# Patient Record
Sex: MALE | Race: WHITE | NOT HISPANIC OR LATINO | Employment: OTHER | URBAN - METROPOLITAN AREA
[De-identification: names, ages, dates, MRNs, and addresses within clinical notes are randomized per-mention and may not be internally consistent; named-entity substitution may affect disease eponyms.]

---

## 2017-03-10 ENCOUNTER — PROCEDURE VISIT (OUTPATIENT)
Dept: UROLOGY | Facility: CLINIC | Age: 77
End: 2017-03-10
Attending: UROLOGY
Payer: MEDICARE

## 2017-03-10 VITALS
BODY MASS INDEX: 26.68 KG/M2 | HEART RATE: 96 BPM | WEIGHT: 170 LBS | SYSTOLIC BLOOD PRESSURE: 197 MMHG | DIASTOLIC BLOOD PRESSURE: 94 MMHG | HEIGHT: 67 IN

## 2017-03-10 DIAGNOSIS — C67.0 MALIGNANT NEOPLASM OF TRIGONE OF URINARY BLADDER: ICD-10-CM

## 2017-03-10 LAB
BILIRUB SERPL-MCNC: ABNORMAL MG/DL
BLOOD URINE, POC: ABNORMAL
COLOR, POC UA: ABNORMAL
GLUCOSE UR QL STRIP: 50
KETONES UR QL STRIP: ABNORMAL
LEUKOCYTE ESTERASE URINE, POC: ABNORMAL
NITRITE, POC UA: ABNORMAL
PH, POC UA: 6
PROTEIN, POC: 100
SPECIFIC GRAVITY, POC UA: 1.01
UROBILINOGEN, POC UA: ABNORMAL

## 2017-03-10 PROCEDURE — 88112 CYTOPATH CELL ENHANCE TECH: CPT | Mod: 26,,, | Performed by: PATHOLOGY

## 2017-03-10 PROCEDURE — 52000 CYSTOURETHROSCOPY: CPT | Mod: S$GLB,,, | Performed by: UROLOGY

## 2017-03-10 PROCEDURE — 81002 URINALYSIS NONAUTO W/O SCOPE: CPT | Mod: S$GLB,,, | Performed by: UROLOGY

## 2017-03-10 PROCEDURE — 88112 CYTOPATH CELL ENHANCE TECH: CPT | Performed by: PATHOLOGY

## 2017-03-10 RX ORDER — SULFAMETHOXAZOLE AND TRIMETHOPRIM 800; 160 MG/1; MG/1
1 TABLET ORAL
Status: COMPLETED | OUTPATIENT
Start: 2017-03-10 | End: 2017-03-10

## 2017-03-10 RX ORDER — LIDOCAINE HYDROCHLORIDE 20 MG/ML
JELLY TOPICAL
Status: COMPLETED | OUTPATIENT
Start: 2017-03-10 | End: 2017-03-10

## 2017-03-10 RX ORDER — FINASTERIDE 5 MG/1
5 TABLET, FILM COATED ORAL DAILY
Qty: 30 TABLET | Refills: 11 | Status: SHIPPED | OUTPATIENT
Start: 2017-03-10 | End: 2017-10-09 | Stop reason: SDUPTHER

## 2017-03-10 RX ADMIN — SULFAMETHOXAZOLE AND TRIMETHOPRIM 1 TABLET: 800; 160 TABLET ORAL at 04:03

## 2017-03-10 RX ADMIN — LIDOCAINE HYDROCHLORIDE: 20 JELLY TOPICAL at 04:03

## 2017-03-10 NOTE — PROCEDURES
Cystoscopy  Date/Time: 3/10/2017 2:39 PM  Performed by: CAMILLE MILLER  Authorized by: CAMILLE MILLER     Consent Done?:  Yes (Written)  Indications: history bladder cancer    Position:  Supine  Anesthesia:  Intraurethral instillation    Scope type:  Flexible cystoscope  Urethra normal: Yes    Prostate normal: No        Hyperplasia         Positive VaricesBladder neck normal: Bladder neck normal   Bladder normal: Yes       +median lobe    Bladder ca solitary low grade Ta 2015  Enlarged prostate    Dc avodart due to cost; start finasteride  Cytology today, if OK, rtc 6 months with cytology for cysto

## 2017-03-10 NOTE — MR AVS SNAPSHOT
[facility]  [address] [place] [address], [address]  [facility] [address]  Phone: [phone]                  Isaak Newberry   3/10/2017 2:00 PM   Procedure visit    Description:  Male : 1940   Provider:  Álvaro Tran MD   Department:  [facility]           Reason for Visit     Other           Diagnoses this Visit        Comments    Malignant neoplasm of trigone of urinary bladder                To Do List           Future Appointments        Provider Department Dept Phone    2017 1:00 PM Álvaro Tran MD [facility] [phone]      Goals (5 Years of Data)     None      [facility]     [facility] Care [facility] -  Assistance  Registered nurses in the [facility] provide clinical advisement, health education, appointment booking, and other advisory services.  Call for this free service at [phone].             Medications           Message regarding Medications     Verify the changes and/or additions to your medication regime listed below are the same as discussed with your clinician today.  If any of these changes or additions are incorrect, please notify your healthcare provider.             Verify that the below list of medications is an accurate representation of the medications you are currently taking.  If none reported, the list may be blank. If incorrect, please contact your healthcare provider. Carry this list with you in case of emergency.           Current Medications     aspirin 81 MG Chew Take 81 mg by mouth once daily.    clopidogrel (PLAVIX) 75 mg tablet Take 75 mg by mouth once daily.     dutasteride-tamsulosin 0.5-0.4 mg CM24     gabapentin (NEURONTIN) 300 MG capsule Take 1 capsule (300 mg total) by mouth every evening.    hydrocodone-acetaminophen 10-325mg (NORCO)  mg Tab     lisinopril (PRINIVIL,ZESTRIL) 20 MG tablet Take 20 mg by mouth once daily.     metformin (GLUCOPHAGE) 1000 MG tablet Take 1 tablet (1,000 mg  "total) by mouth 2 (two) times daily with meals.    metoprolol succinate (TOPROL-XL) 12.5 MG 24 hr split tablet Take by mouth once daily.    pravastatin (PRAVACHOL) 80 MG tablet Take 1 tablet (80 mg total) by mouth nightly.    ranitidine (ZANTAC) 300 MG tablet Take 1 tablet (300 mg total) by mouth every evening.           Clinical Reference Information           Your Vitals Were     BP Pulse Height Weight BMI    197/94 (BP Location: Left arm, Patient Position: Sitting, BP Method: Automatic) 96 5' 7" (1.702 m) 77.1 kg (170 lb) 26.63 kg/m2      Blood Pressure          Most Recent Value    BP  (!)  197/94      Allergies as of 3/10/2017     No Known Allergies      Immunizations Administered on Date of Encounter - 3/10/2017     None      Orders Placed During Today's Visit      Normal Orders This Visit    Cystoscopy       [facility] Sign-Up     Activating your [facility] account is as easy as 1-2-3!     1) Visit [email or web address], select Sign Up Now, enter this activation code and your date of birth, then select Next.  [email or web address]  Expires: 4/24/2017  2:26 PM      2) Create a username and password to use when you visit [facility] in the future and select a security question in case you lose your password and select Next.    3) Enter your e-mail address and click Sign Up!    Additional Information  If you have questions, please e-mail [email or web address] or call [phone] to talk to our [facility] staff. Remember, [facility] is NOT to be used for urgent needs. For medical emergencies, dial 911.         Language Assistance Services     ATTENTION: Language assistance services are available, free of charge. Please call [phone].      ATENCIÓN: Si habla español, tiene a lott disposición servicios gratuitos de asistencia lingüística. Llame al [phone].     CHÚ Ý: N?u b?n nói Ti?ng Vi?t, có các d?ch v? h? tr? ngôn ng? mi?n phí dành cho b?n. G?i s? [phone].         [facility] complies with applicable " Federal civil rights laws and does not discriminate on the basis of race, color, national origin, age, disability, or sex.

## 2017-05-01 ENCOUNTER — TELEPHONE (OUTPATIENT)
Dept: UROLOGY | Facility: CLINIC | Age: 77
End: 2017-05-01

## 2017-05-01 NOTE — TELEPHONE ENCOUNTER
----- Message from Duyen Lebron sent at 5/1/2017 12:54 PM CDT -----  Contact: Self  x  1st Request  _  2nd Request  _  3rd Request        Who: JESSIKA STOLL [[id number]]    Why: Pt states he is a current pt of Dr. Tran and wants his wife to see Dr. Tran for bladder issues. Pt was advised that Dr. Tran does not see new pt's for this concern. Pt was offered a different Physician, but declined.Pt requested to speak to the clinical team. Please call, thanks!    What Number to Call Back: [phone]    When to Expect a call back: (Before the end of the day)   -- if the call is after 12:00, the call back will be tomorrow.

## 2017-05-01 NOTE — TELEPHONE ENCOUNTER
Spoke with patient's wife.  Informed her Dr Tran does not treat urinary incontinence.  Appointment made for patient to see Dr Beaulieu.  Appointment letter mailed to patient

## 2017-05-08 ENCOUNTER — LAB VISIT (OUTPATIENT)
Dept: LAB | Facility: HOSPITAL | Age: 77
End: 2017-05-08
Attending: UROLOGY
Payer: MEDICARE

## 2017-05-08 ENCOUNTER — TELEPHONE (OUTPATIENT)
Dept: UROLOGY | Facility: CLINIC | Age: 77
End: 2017-05-08

## 2017-05-08 DIAGNOSIS — R31.9 HEMATURIA: ICD-10-CM

## 2017-05-08 DIAGNOSIS — R31.9 HEMATURIA: Primary | ICD-10-CM

## 2017-05-08 PROCEDURE — 87086 URINE CULTURE/COLONY COUNT: CPT

## 2017-05-08 NOTE — TELEPHONE ENCOUNTER
----- Message from Adelaida Maza sent at 5/8/2017  8:20 AM CDT -----  Contact: pt   X_  1st Request  _  2nd Request  _  3rd Request    Who:JESSIKA STOLL [[id number]]    Why: Patient states he has been urinating blood..... Please contact patient to further discuss and advise     What Number to Call Back: [phone]    When to Expect a call back: (Before the end of the day)   -- if call after 3:00 call back will be tomorrow.

## 2017-05-08 NOTE — TELEPHONE ENCOUNTER
I faxed order to  for US.  He brought urine for a culture to [facility] today.  He is double-booked at 8am on Friday morning.

## 2017-05-08 NOTE — TELEPHONE ENCOUNTER
"I spoke to pt  He feels well.  The urine is clearing "100%"  He will call his cardioloigst to see if he can hold his plavix.  He cannot get the US done until Thursday - he wants this done at [facility].  If he has severe, bleeding, weakness, dizziness etc, he will go to the nearest ER  Please schedule US for Thursday and appt to see me Friday    Thanks    sc  "

## 2017-05-10 ENCOUNTER — HOSPITAL ENCOUNTER (OUTPATIENT)
Dept: RADIOLOGY | Facility: HOSPITAL | Age: 77
Discharge: HOME | End: 2017-05-10
Attending: UROLOGY
Payer: MEDICARE

## 2017-05-10 DIAGNOSIS — R31.9 HEMATURIA: ICD-10-CM

## 2017-05-10 LAB — BACTERIA UR CULT: NORMAL

## 2017-05-10 PROCEDURE — 76700 US EXAM ABDOM COMPLETE: CPT | Mod: 26,,, | Performed by: RADIOLOGY

## 2017-05-10 PROCEDURE — 76700 US EXAM ABDOM COMPLETE: CPT | Mod: TC,PO

## 2017-05-12 ENCOUNTER — TELEPHONE (OUTPATIENT)
Dept: UROLOGY | Facility: CLINIC | Age: 77
End: 2017-05-12
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
## 2017-05-12 NOTE — TELEPHONE ENCOUNTER
----- Message from Marisela Monge sent at 5/12/2017 11:44 AM CDT -----  Contact: pt  _  1st Request  _  2nd Request  _  3rd Request        Who: pt    Why: pt requests his test results from the nurse. Please call the pt with results    What Number to Call Back:[phone]    When to Expect a call back: (Before the end of the day)   -- if the call is after 12:00, the call back will be tomorrow.

## 2017-05-12 NOTE — TELEPHONE ENCOUNTER
----- Message from Alison Herbbrea sent at 5/12/2017 12:34 PM CDT -----  _  1st Request  _x  2nd Request  _  3rd Request        Who: JESSIKA STOLL [[id number]]    Why: Pt returned a call from the office he stated that he will not be coming for his appointment, but he need to speak to the nurse as soon as possible. Please call him     What Number to Call Back: [phone]    When to Expect a call back: (Before the end of the day)   -- if the call is after 12:00, the call back will be tomorrow.

## 2017-05-17 ENCOUNTER — LAB VISIT (OUTPATIENT)
Dept: LAB | Facility: HOSPITAL | Age: 77
End: 2017-05-17
Attending: UROLOGY
Payer: MEDICARE

## 2017-05-17 DIAGNOSIS — R30.0 DYSURIA: ICD-10-CM

## 2017-05-17 DIAGNOSIS — R39.9 LOWER URINARY TRACT SYMPTOMS (LUTS): Primary | ICD-10-CM

## 2017-05-17 PROCEDURE — 87077 CULTURE AEROBIC IDENTIFY: CPT

## 2017-05-17 PROCEDURE — 87186 SC STD MICRODIL/AGAR DIL: CPT

## 2017-05-17 PROCEDURE — 87086 URINE CULTURE/COLONY COUNT: CPT

## 2017-05-17 PROCEDURE — 87088 URINE BACTERIA CULTURE: CPT

## 2017-05-17 RX ORDER — TAMSULOSIN HYDROCHLORIDE 0.4 MG/1
0.4 CAPSULE ORAL DAILY
Qty: 90 CAPSULE | Refills: 3 | Status: SHIPPED | OUTPATIENT
Start: 2017-05-17 | End: 2017-05-19

## 2017-05-17 NOTE — TELEPHONE ENCOUNTER
Pt understands that culture from 9 days ago was negative, but states that he has been having burning on urination for the last few days.  I ordered UC. He will go to [facility] this afternoon and bring urine for culture.  He has azo and is taking this.  I did not call in antibiotics.

## 2017-05-17 NOTE — TELEPHONE ENCOUNTER
----- Message from Hope Stern sent at 5/17/2017  1:57 PM CDT -----  Contact: pt  _x  1st Request  _  2nd Request  _  3rd Request      Who:pt    Why: pt states that he would like to speak to staff in regards to possible bladder infection     What Number to Call Back: [phone]    When to Expect a call back: (Before the end of the day)   -- if call after 3:00 call back will be tomorrow.

## 2017-05-18 ENCOUNTER — TELEPHONE (OUTPATIENT)
Dept: UROLOGY | Facility: CLINIC | Age: 77
End: 2017-05-18

## 2017-05-18 NOTE — TELEPHONE ENCOUNTER
Pt's son called today to get UC results on urine his father  dropped off yesterday at lab.  They of course were not available this soon.  Pt had vomiting, denies fever, nausea.  Per Dr. Tran, pt added to Anusha's schedule and he will come to office with his son.

## 2017-05-19 ENCOUNTER — OFFICE VISIT (OUTPATIENT)
Dept: UROLOGY | Facility: CLINIC | Age: 77
End: 2017-05-19
Payer: MEDICARE

## 2017-05-19 VITALS
SYSTOLIC BLOOD PRESSURE: 192 MMHG | HEART RATE: 107 BPM | HEIGHT: 67 IN | BODY MASS INDEX: 26.68 KG/M2 | WEIGHT: 170 LBS | TEMPERATURE: 96 F | DIASTOLIC BLOOD PRESSURE: 86 MMHG

## 2017-05-19 DIAGNOSIS — N39.0 URINARY TRACT INFECTION WITH HEMATURIA, SITE UNSPECIFIED: Primary | ICD-10-CM

## 2017-05-19 DIAGNOSIS — R31.9 URINARY TRACT INFECTION WITH HEMATURIA, SITE UNSPECIFIED: Primary | ICD-10-CM

## 2017-05-19 LAB — BACTERIA UR CULT: NORMAL

## 2017-05-19 PROCEDURE — 3079F DIAST BP 80-89 MM HG: CPT | Mod: S$GLB,,, | Performed by: NURSE PRACTITIONER

## 2017-05-19 PROCEDURE — 99213 OFFICE O/P EST LOW 20 MIN: CPT | Mod: 25,S$GLB,, | Performed by: NURSE PRACTITIONER

## 2017-05-19 PROCEDURE — 99999 PR PBB SHADOW E&M-EST. PATIENT-LVL II: CPT | Mod: PBBFAC,,, | Performed by: NURSE PRACTITIONER

## 2017-05-19 PROCEDURE — 1159F MED LIST DOCD IN RCRD: CPT | Mod: S$GLB,,, | Performed by: NURSE PRACTITIONER

## 2017-05-19 PROCEDURE — 96372 THER/PROPH/DIAG INJ SC/IM: CPT | Mod: S$GLB,,, | Performed by: NURSE PRACTITIONER

## 2017-05-19 PROCEDURE — 1125F AMNT PAIN NOTED PAIN PRSNT: CPT | Mod: S$GLB,,, | Performed by: NURSE PRACTITIONER

## 2017-05-19 PROCEDURE — 1160F RVW MEDS BY RX/DR IN RCRD: CPT | Mod: S$GLB,,, | Performed by: NURSE PRACTITIONER

## 2017-05-19 PROCEDURE — 3077F SYST BP >= 140 MM HG: CPT | Mod: S$GLB,,, | Performed by: NURSE PRACTITIONER

## 2017-05-19 RX ORDER — CEFTRIAXONE 1 G/1
1 INJECTION, POWDER, FOR SOLUTION INTRAMUSCULAR; INTRAVENOUS
Status: COMPLETED | OUTPATIENT
Start: 2017-05-19 | End: 2017-05-19

## 2017-05-19 RX ORDER — CIPROFLOXACIN 500 MG/1
500 TABLET ORAL 2 TIMES DAILY
Qty: 14 TABLET | Refills: 0 | Status: SHIPPED | OUTPATIENT
Start: 2017-05-19 | End: 2017-05-26

## 2017-05-19 RX ADMIN — CEFTRIAXONE 1 G: 1 INJECTION, POWDER, FOR SOLUTION INTRAMUSCULAR; INTRAVENOUS at 11:05

## 2017-05-19 NOTE — PROGRESS NOTES
"Subjective:      Isaak Newberry is a 77 y.o. male who returns today regarding his UTI symptoms. He is an established patient of Dr. Tran and is new to me today.     He presents today reporting dysuria, urinary hesitancy, frequency, and urgency for the last 3 days. He also reports a low grade fever that developed yesterday. Denies flank pain, N/V, and hematuria. Urine culture is + for presumptive E coli. Recent abdominal US shows no renal stones. The patient denies a history of frequent urinary infections.     The following portions of the patient's history were reviewed and updated as appropriate: allergies, current medications, past family history, past medical history, past social history, past surgical history and problem list.    Review of Systems  A comprehensive multipoint review of systems was negative except as otherwise stated in the HPI.     Objective:   Vitals: BP (!) 192/86 (BP Location: Right arm, Patient Position: Sitting, BP Method: Automatic)  Pulse 107  Temp 96.1 °F (35.6 °C)  Ht 5' 7" (1.702 m)  Wt 77.1 kg (169 lb 15.6 oz)  BMI 26.62 kg/m2    Physical Exam   General: alert and oriented, no acute distress  Respiratory: Symmetric expansion, non-labored breathing  Cardiovascular: no peripheral edema  Abdomen: soft, non distended  Skin: normal coloration and turgor, no rashes, no suspicious skin lesions noted  Neuro: no gross deficits  Psych: normal judgment and insight, normal mood/affect and non-anxious    Lab Review   Urinalysis demonstrates : no urine sample today; + urine culture for e coli   Lab Results   Component Value Date    WBC 8.15 12/30/2016    HGB 16.2 12/30/2016    HCT 43.3 12/30/2016    MCV 95 12/30/2016     12/30/2016     Lab Results   Component Value Date    CREATININE 1.21 12/30/2016    BUN 25 (H) 12/30/2016     Urine Culture    2d ago     Urine Culture, Routine PRESUMPTIVE E COLI  >100,000 cfu/ml  Identification and susceptibility pending  P     Imaging   All " "images have been personally reviewed and agree with the findings below:   Renal US (5/8/17)- "1.  No acute intra-abdominal process appreciated.  No definite renal stones or solid renal mass appreciated to explain hematuria. 2.  Echogenic kidneys which could signify underlying chronic medical renal disease. 3.  Status post cholecystectomy."    Assessment and Plan:   Isaak was seen today for establish care.    Diagnoses and all orders for this visit:    Urinary tract infection with hematuria, site unspecified  -     cefTRIAXone injection 1 g; Inject 1 g into the muscle one time.  -     ciprofloxacin HCl (CIPRO) 500 MG tablet; Take 1 tablet (500 mg total) by mouth 2 (two) times daily.    Plan:  --Urine culture + for E coli, susceptibility pending - will notify the patient if antibiotics need to be adjusted   --Rocephin 1 gram in the clinic today    --Cipro x 1 week  --Patient again instructed to follow up with his cardiologist and PCP regarding his Plavix therapy.     --Instructed the patient to report to the ED if his symptoms worsen or if he develops fever. Patient verbalized understanding.   "

## 2017-05-23 ENCOUNTER — TELEPHONE (OUTPATIENT)
Dept: UROLOGY | Facility: CLINIC | Age: 77
End: 2017-05-23

## 2017-05-23 NOTE — TELEPHONE ENCOUNTER
----- Message from Marisela Monge sent at 5/23/2017  3:58 PM CDT -----  Contact: pt  _  1st Request  _  2nd Request  _  3rd Request        Who: pt    Why: pt needs his test results. Please call the pt     What Number to Call Back:[phone]    When to Expect a call back: (Before the end of the day)   -- if the call is after 12:00, the call back will be tomorrow.

## 2017-10-09 DIAGNOSIS — N40.0 ENLARGED PROSTATE: Primary | ICD-10-CM

## 2017-10-09 RX ORDER — FINASTERIDE 5 MG/1
5 TABLET, FILM COATED ORAL DAILY
Qty: 90 TABLET | Refills: 2 | Status: SHIPPED | OUTPATIENT
Start: 2017-10-09 | End: 2018-05-23 | Stop reason: SDUPTHER

## 2018-04-25 DIAGNOSIS — C67.9 MALIGNANT NEOPLASM OF URINARY BLADDER, UNSPECIFIED SITE: Primary | ICD-10-CM

## 2018-05-23 DIAGNOSIS — N40.0 ENLARGED PROSTATE: ICD-10-CM

## 2018-05-23 RX ORDER — TAMSULOSIN HYDROCHLORIDE 0.4 MG/1
0.4 CAPSULE ORAL DAILY
Qty: 90 CAPSULE | Refills: 3 | Status: SHIPPED | OUTPATIENT
Start: 2018-05-23 | End: 2018-05-23 | Stop reason: SDUPTHER

## 2018-05-23 RX ORDER — FINASTERIDE 5 MG/1
5 TABLET, FILM COATED ORAL DAILY
Qty: 90 TABLET | Refills: 3 | Status: SHIPPED | OUTPATIENT
Start: 2018-05-23 | End: 2018-05-23 | Stop reason: SDUPTHER

## 2018-06-05 ENCOUNTER — TELEPHONE (OUTPATIENT)
Dept: UROLOGY | Facility: CLINIC | Age: 78
End: 2018-06-05

## 2018-06-05 NOTE — TELEPHONE ENCOUNTER
----- Message from Brandee Lane sent at 6/5/2018  9:47 AM CDT -----  Contact: JESSIKA STOLL [[id number]]            Name of Who is Calling: JESSIKA STOLL [[id number]]    What is the request in detail: pt would like to cancel procedure and will call back to reschedule.     Can the clinic reply by [facility]: no    What [facility] Call Back [facility]: [phone] (home)

## 2018-06-27 ENCOUNTER — TELEPHONE (OUTPATIENT)
Dept: UROLOGY | Facility: CLINIC | Age: 78
End: 2018-06-27

## 2018-06-27 NOTE — TELEPHONE ENCOUNTER
I spoke to pt.  His wife is in the hospital and he needs to r/s cystoscopy.  Rescheduled and appt letter sent.

## 2018-06-27 NOTE — TELEPHONE ENCOUNTER
----- Message from Louise Yan sent at 6/27/2018 10:54 AM CDT -----  Contact: Guanako Newberry             Name of Who is Calling: Guanako Newberry Son       What is the request in detail: Pt son is calling to inform clinical staff that pt won't be able to make it to his appointment on Friday due to being in the hospital with his wife.  Pt son says that pt can be reached at [facility] at [phone] in room 203 and if he can't be reached there then the son can be reached.       Can the clinic reply by [facility]: No      What [facility] Back [facility]: [phone]

## 2018-08-12 PROBLEM — E87.1 HYPONATREMIA: Status: ACTIVE | Noted: 2018-08-12
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
## 2018-08-12 PROBLEM — N39.0 UTI (URINARY TRACT INFECTION): Status: ACTIVE | Noted: 2018-08-12

## 2018-08-12 PROBLEM — R19.7 DIARRHEA: Status: ACTIVE | Noted: 2018-08-12

## 2018-08-12 PROBLEM — E87.6 HYPOKALEMIA: Status: ACTIVE | Noted: 2018-08-12

## 2018-08-12 PROBLEM — R31.0 GROSS HEMATURIA: Status: ACTIVE | Noted: 2018-08-12

## 2018-08-12 PROBLEM — S51.012A SKIN TEAR OF LEFT ELBOW WITHOUT COMPLICATION: Status: ACTIVE | Noted: 2018-08-12

## 2018-08-13 ENCOUNTER — TELEPHONE (OUTPATIENT)
Dept: NEUROLOGY | Facility: CLINIC | Age: 78
End: 2018-08-13

## 2018-08-13 NOTE — TELEPHONE ENCOUNTER
----- Message from Sabrina Lockhart MD sent at 8/13/2018  1:40 PM CDT -----  Can we arrange an appointment for his with Dr. Lau or Sivan for diagnosis of chorea?

## 2019-06-25 DIAGNOSIS — N40.0 ENLARGED PROSTATE: ICD-10-CM

## 2019-06-25 RX ORDER — FINASTERIDE 5 MG/1
TABLET, FILM COATED ORAL
Qty: 90 TABLET | Refills: 0 | Status: SHIPPED | OUTPATIENT
Start: 2019-06-25 | End: 2019-08-20 | Stop reason: SDUPTHER

## 2019-06-28 ENCOUNTER — EXTERNAL HOME HEALTH (OUTPATIENT)
Dept: HOME HEALTH SERVICES | Facility: HOSPITAL | Age: 79
End: 2019-06-28

## 2019-07-12 PROBLEM — N39.0 UTI (URINARY TRACT INFECTION): Status: RESOLVED | Noted: 2018-08-12 | Resolved: 2019-07-12
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
## 2019-07-12 PROBLEM — S51.012A SKIN TEAR OF LEFT ELBOW WITHOUT COMPLICATION: Status: RESOLVED | Noted: 2018-08-12 | Resolved: 2019-07-12

## 2019-07-12 PROBLEM — R19.7 DIARRHEA: Status: RESOLVED | Noted: 2018-08-12 | Resolved: 2019-07-12

## 2020-02-10 ENCOUNTER — TELEPHONE (OUTPATIENT)
Dept: UROLOGY | Facility: CLINIC | Age: 80
End: 2020-02-10

## 2020-02-10 NOTE — TELEPHONE ENCOUNTER
----- Message from Suresh Tovar sent at 2/10/2020  1:51 PM CST -----  Contact: JESSIKA STOLL [[id number]]   Name of Who is Calling:     What is the request in detail: patient request call back in reference to questions/concerns about lab results   Please contact to further discuss and advise      Can the clinic reply by [facility]: no     What Number to Call Back if not in [facility]:  [phone]

## 2020-02-10 NOTE — TELEPHONE ENCOUNTER
I called the patient     He saw me last in 2017 and recently had seen another urologist on the [place]    We will schedule an appointment with the nurse practitioner.  We will check a urinalysis, urine cytology and this schedule cystoscopy    He agrees with this plan

## 2020-02-10 NOTE — TELEPHONE ENCOUNTER
----- Message from Elvia Sharpe MA sent at 2/10/2020  2:12 PM CST -----  Contact: JESSIKA STOLL [[id number]]   Patient stated that he spoke to his friend and was informed that if he took zantac that may have given him bladder cancer.thank you  ----- Message -----  From: Suresh Tovar  Sent: 2/10/2020   1:51 PM CST  To: [facility] Staff     Name of Who is Calling:     What is the request in detail: patient request call back in reference to questions/concerns about lab results   Please contact to further discuss and advise      Can the clinic reply by [facility]: no     What Number to Call Back if not in [facility]:  [phone]

## 2020-02-11 ENCOUNTER — TELEPHONE (OUTPATIENT)
Dept: UROLOGY | Facility: CLINIC | Age: 80
End: 2020-02-11

## 2020-02-11 NOTE — TELEPHONE ENCOUNTER
----- Message from Elvia Sharpe MA sent at 2/10/2020  2:28 PM CST -----        Preferred Name:   Lam Stoll  Male, 80 y.o., 1940  Phone:   [phone] (H)  PCP:   Prashant Urbano MD  Language:   English  Need Interp:   No  Allergies Last Reviewed:   11/14/19  Allergies:   Metformin  Health Maintenance:   Due  FYI  General  Primary Ins.:   [employer] MA[employer]GED MEDICARE  MRN:   [id number]  Pt Comm Pref:   Patient Portal, Mail  Next Appt:   None  My Sticky Note:     Specialty Comments:     Patient Calls     Álvaro Tran MD routed conversation to [facility] Staff 7 minutes ago (2:20 PM)    Álvaro Tran MD 7 minutes ago (2:20 PM)          I called the patient      He saw me last in 2017 and recently had seen another urologist on the [place]     We will schedule an appointment with the nurse practitioner.  We will check a urinalysis, urine cytology and this schedule cystoscopy     He agrees with this plan      Documentation     Álvaro Tran MD 8 minutes ago (2:19 PM)          ----- Message from Elvia Sharpe MA sent at 2/10/2020  2:12 PM CST -----  Contact: JESSIKA STOLL [[id number]]   Patient stated that he spoke to his friend and was informed that if he took zantac that may have given him bladder cancer.thank you  ----- Message -----  From: Suresh Tovar  Sent: 2/10/2020   1:51 PM CST  To: [facility] Staff      Name of Who is Calling:      What is the request in detail: patient request call back in reference to questions/concerns about lab results   Please contact to further discuss and advise       Can the clinic reply by [facility]: no      What [facility]:  [phone]            Documentation

## 2020-07-14 ENCOUNTER — TELEPHONE (OUTPATIENT)
Dept: UROLOGY | Facility: CLINIC | Age: 80
End: 2020-07-14

## 2020-07-14 ENCOUNTER — OFFICE VISIT (OUTPATIENT)
Dept: FAMILY MEDICINE | Facility: CLINIC | Age: 80
End: 2020-07-14
Payer: MEDICARE

## 2020-07-14 ENCOUNTER — LAB VISIT (OUTPATIENT)
Dept: LAB | Facility: HOSPITAL | Age: 80
End: 2020-07-14
Attending: NURSE PRACTITIONER
Payer: MEDICARE

## 2020-07-14 VITALS
DIASTOLIC BLOOD PRESSURE: 76 MMHG | WEIGHT: 153.25 LBS | OXYGEN SATURATION: 98 % | HEART RATE: 88 BPM | BODY MASS INDEX: 24.05 KG/M2 | HEIGHT: 67 IN | TEMPERATURE: 99 F | SYSTOLIC BLOOD PRESSURE: 128 MMHG

## 2020-07-14 DIAGNOSIS — E87.1 LOW SODIUM LEVELS: ICD-10-CM

## 2020-07-14 DIAGNOSIS — N40.0 BENIGN PROSTATIC HYPERPLASIA, UNSPECIFIED WHETHER LOWER URINARY TRACT SYMPTOMS PRESENT: ICD-10-CM

## 2020-07-14 DIAGNOSIS — R30.0 DYSURIA: ICD-10-CM

## 2020-07-14 DIAGNOSIS — N30.00 ACUTE CYSTITIS WITHOUT HEMATURIA: ICD-10-CM

## 2020-07-14 DIAGNOSIS — N18.30 KIDNEY DISEASE, CHRONIC, STAGE III (GFR 30-59 ML/MIN): ICD-10-CM

## 2020-07-14 DIAGNOSIS — N30.00 ACUTE CYSTITIS WITHOUT HEMATURIA: Primary | ICD-10-CM

## 2020-07-14 LAB
ALBUMIN SERPL BCP-MCNC: 3.4 G/DL (ref 3.5–5.2)
ALP SERPL-CCNC: 77 U/L (ref 55–135)
ALT SERPL W/O P-5'-P-CCNC: 11 U/L (ref 10–44)
ANION GAP SERPL CALC-SCNC: 10 MMOL/L (ref 8–16)
AST SERPL-CCNC: 16 U/L (ref 10–40)
BACTERIA #/AREA URNS HPF: ABNORMAL /HPF
BASOPHILS # BLD AUTO: 0.05 K/UL (ref 0–0.2)
BASOPHILS NFR BLD: 0.4 % (ref 0–1.9)
BILIRUB SERPL-MCNC: 0.7 MG/DL (ref 0.1–1)
BILIRUB UR QL STRIP: NEGATIVE
BUN SERPL-MCNC: 25 MG/DL (ref 8–23)
CALCIUM SERPL-MCNC: 8.8 MG/DL (ref 8.7–10.5)
CHLORIDE SERPL-SCNC: 96 MMOL/L (ref 95–110)
CLARITY UR: ABNORMAL
CO2 SERPL-SCNC: 25 MMOL/L (ref 23–29)
COLOR UR: ABNORMAL
CREAT SERPL-MCNC: 1.4 MG/DL (ref 0.5–1.4)
DIFFERENTIAL METHOD: ABNORMAL
EOSINOPHIL # BLD AUTO: 0.1 K/UL (ref 0–0.5)
EOSINOPHIL NFR BLD: 1.2 % (ref 0–8)
ERYTHROCYTE [DISTWIDTH] IN BLOOD BY AUTOMATED COUNT: 12.6 % (ref 11.5–14.5)
EST. GFR  (AFRICAN AMERICAN): 53.7 ML/MIN/1.73 M^2
EST. GFR  (NON AFRICAN AMERICAN): 46.5 ML/MIN/1.73 M^2
GLUCOSE SERPL-MCNC: 181 MG/DL (ref 70–110)
GLUCOSE UR QL STRIP: ABNORMAL
HCT VFR BLD AUTO: 34 % (ref 40–54)
HGB BLD-MCNC: 12 G/DL (ref 14–18)
HGB UR QL STRIP: ABNORMAL
HYALINE CASTS #/AREA URNS LPF: 0 /LPF
IMM GRANULOCYTES # BLD AUTO: 0.04 K/UL (ref 0–0.04)
IMM GRANULOCYTES NFR BLD AUTO: 0.3 % (ref 0–0.5)
KETONES UR QL STRIP: NEGATIVE
LEUKOCYTE ESTERASE UR QL STRIP: ABNORMAL
LYMPHOCYTES # BLD AUTO: 1.1 K/UL (ref 1–4.8)
LYMPHOCYTES NFR BLD: 9.7 % (ref 18–48)
MCH RBC QN AUTO: 32.7 PG (ref 27–31)
MCHC RBC AUTO-ENTMCNC: 35.3 G/DL (ref 32–36)
MCV RBC AUTO: 93 FL (ref 82–98)
MICROSCOPIC COMMENT: ABNORMAL
MONOCYTES # BLD AUTO: 0.8 K/UL (ref 0.3–1)
MONOCYTES NFR BLD: 6.9 % (ref 4–15)
NEUTROPHILS # BLD AUTO: 9.3 K/UL (ref 1.8–7.7)
NEUTROPHILS NFR BLD: 81.5 % (ref 38–73)
NITRITE UR QL STRIP: POSITIVE
NRBC BLD-RTO: 0 /100 WBC
PH UR STRIP: 6 [PH] (ref 5–8)
PLATELET # BLD AUTO: 189 K/UL (ref 150–350)
PMV BLD AUTO: 9.5 FL (ref 9.2–12.9)
POTASSIUM SERPL-SCNC: 3.8 MMOL/L (ref 3.5–5.1)
PROT SERPL-MCNC: 6.1 G/DL (ref 6–8.4)
PROT UR QL STRIP: ABNORMAL
RBC # BLD AUTO: 3.67 M/UL (ref 4.6–6.2)
RBC #/AREA URNS HPF: 2 /HPF (ref 0–4)
SODIUM SERPL-SCNC: 131 MMOL/L (ref 136–145)
SP GR UR STRIP: 1.01 (ref 1–1.03)
URN SPEC COLLECT METH UR: ABNORMAL
WBC # BLD AUTO: 11.46 K/UL (ref 3.9–12.7)
WBC #/AREA URNS HPF: >100 /HPF (ref 0–5)

## 2020-07-14 PROCEDURE — 3078F PR MOST RECENT DIASTOLIC BLOOD PRESSURE < 80 MM HG: ICD-10-PCS | Mod: CPTII,S$GLB,, | Performed by: NURSE PRACTITIONER

## 2020-07-14 PROCEDURE — 99999 PR PBB SHADOW E&M-EST. PATIENT-LVL V: CPT | Mod: PBBFAC,,, | Performed by: NURSE PRACTITIONER

## 2020-07-14 PROCEDURE — 36415 COLL VENOUS BLD VENIPUNCTURE: CPT | Mod: PO

## 2020-07-14 PROCEDURE — 99214 PR OFFICE/OUTPT VISIT, EST, LEVL IV, 30-39 MIN: ICD-10-PCS | Mod: S$GLB,,, | Performed by: NURSE PRACTITIONER

## 2020-07-14 PROCEDURE — 1126F PR PAIN SEVERITY QUANTIFIED, NO PAIN PRESENT: ICD-10-PCS | Mod: S$GLB,,, | Performed by: NURSE PRACTITIONER

## 2020-07-14 PROCEDURE — 1159F MED LIST DOCD IN RCRD: CPT | Mod: S$GLB,,, | Performed by: NURSE PRACTITIONER

## 2020-07-14 PROCEDURE — 1101F PT FALLS ASSESS-DOCD LE1/YR: CPT | Mod: CPTII,S$GLB,, | Performed by: NURSE PRACTITIONER

## 2020-07-14 PROCEDURE — 3078F DIAST BP <80 MM HG: CPT | Mod: CPTII,S$GLB,, | Performed by: NURSE PRACTITIONER

## 2020-07-14 PROCEDURE — 1159F PR MEDICATION LIST DOCUMENTED IN MEDICAL RECORD: ICD-10-PCS | Mod: S$GLB,,, | Performed by: NURSE PRACTITIONER

## 2020-07-14 PROCEDURE — 99214 OFFICE O/P EST MOD 30 MIN: CPT | Mod: S$GLB,,, | Performed by: NURSE PRACTITIONER

## 2020-07-14 PROCEDURE — 80053 COMPREHEN METABOLIC PANEL: CPT

## 2020-07-14 PROCEDURE — 3074F SYST BP LT 130 MM HG: CPT | Mod: CPTII,S$GLB,, | Performed by: NURSE PRACTITIONER

## 2020-07-14 PROCEDURE — 81000 URINALYSIS NONAUTO W/SCOPE: CPT | Mod: PO

## 2020-07-14 PROCEDURE — 1126F AMNT PAIN NOTED NONE PRSNT: CPT | Mod: S$GLB,,, | Performed by: NURSE PRACTITIONER

## 2020-07-14 PROCEDURE — 85025 COMPLETE CBC W/AUTO DIFF WBC: CPT

## 2020-07-14 PROCEDURE — 3074F PR MOST RECENT SYSTOLIC BLOOD PRESSURE < 130 MM HG: ICD-10-PCS | Mod: CPTII,S$GLB,, | Performed by: NURSE PRACTITIONER

## 2020-07-14 PROCEDURE — 1101F PR PT FALLS ASSESS DOC 0-1 FALLS W/OUT INJ PAST YR: ICD-10-PCS | Mod: CPTII,S$GLB,, | Performed by: NURSE PRACTITIONER

## 2020-07-14 PROCEDURE — 99999 PR PBB SHADOW E&M-EST. PATIENT-LVL V: ICD-10-PCS | Mod: PBBFAC,,, | Performed by: NURSE PRACTITIONER

## 2020-07-14 PROCEDURE — 87086 URINE CULTURE/COLONY COUNT: CPT

## 2020-07-14 RX ORDER — SULFAMETHOXAZOLE AND TRIMETHOPRIM 800; 160 MG/1; MG/1
1 TABLET ORAL 2 TIMES DAILY
Qty: 14 TABLET | Refills: 0 | Status: SHIPPED | OUTPATIENT
Start: 2020-07-14 | End: 2020-07-21

## 2020-07-14 NOTE — PROGRESS NOTES
Please call the patient and let him know that the urine shows infection. I have sent bactrim in to the pharmacy. We will follow up with the urine culture. Thanks

## 2020-07-14 NOTE — PROGRESS NOTES
Subjective:       Patient ID: Isaak Newberry is a 80 y.o. male.    Chief Complaint: Urinary Tract Infection (blood in urine today---)    Patient who is new to me presents for dysuria and hematuria. He states he noticed a small amount of blood this AM. He took pyridium for the symptoms and it helped. He is scheduled to see urology in a few weeks.     Dysuria   This is a new problem. The current episode started today. The problem occurs intermittently. The problem has been unchanged. The quality of the pain is described as burning (burning in the morning). There has been no fever. Associated symptoms include frequency, hematuria and urgency. Pertinent negatives include no behavior changes, chills, discharge, flank pain or withholding. Treatments tried: pyridium. The treatment provided moderate relief. 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 There is no history of diabetes mellitus or recurrent UTIs.     Review of Systems   Constitutional: Negative for chills, fatigue and fever.   HENT: Negative for congestion, ear pain, sinus pressure and sore throat.    Respiratory: Negative for shortness of breath and wheezing.    Cardiovascular: Negative for chest pain and leg swelling.   Gastrointestinal: Negative for abdominal distention and abdominal pain.   Genitourinary: Positive for dysuria, frequency, hematuria and urgency. Negative for flank pain.   Skin: Negative for color change and pallor.   Neurological: Negative for light-headedness, numbness and headaches.       Objective:      Physical Exam  Vitals signs and nursing note reviewed.   Constitutional:       Appearance: He is well-developed.   HENT:      Head: Normocephalic and atraumatic.      Right Ear: External ear normal.      Left Ear: External ear normal.   Eyes:      Conjunctiva/sclera: Conjunctivae normal.      Pupils: Pupils are equal, round, and reactive to light.   Neck:       Musculoskeletal: Normal range of motion and neck supple.   Cardiovascular:      Rate and Rhythm: Normal rate and regular rhythm.   Pulmonary:      Effort: Pulmonary effort is normal.      Breath sounds: Normal breath sounds.   Abdominal:      General: Bowel sounds are normal.      Palpations: Abdomen is soft.      Tenderness: There is no abdominal tenderness. There is no right CVA tenderness or left CVA tenderness.   Musculoskeletal: Normal range of motion.   Skin:     General: Skin is warm and dry.   Neurological:      Mental Status: He is alert and oriented to person, place, and time.         Assessment:       1. Acute cystitis without hematuria    2. Dysuria    3. Low sodium levels    4. Stage 3 CKD    5. Benign prostatic hyperplasia, unspecified whether lower urinary tract symptoms present        Plan:       Isaak was seen today for urinary tract infection.    Diagnoses and all orders for this visit:    Acute cystitis without hematuria  -     Comprehensive metabolic panel; Future  -     CBC auto differential; Future    Dysuria  -     Urinalysis, Reflex to Urine Culture Urine, Clean Catch  -     Comprehensive metabolic panel; Future  -     CBC auto differential; Future    Low sodium levels  -     Basic metabolic panel; Future    Stage 3 CKD  -     Urinalysis, Reflex to Urine Culture Urine, Clean Catch  -     Comprehensive metabolic panel; Future  -     Urinalysis Microscopic  -     Urine culture  -     sulfamethoxazole-trimethoprim 800-160mg (BACTRIM DS) 800-160 mg Tab; Take 1 tablet by mouth 2 (two) times daily. for 7 days    Benign prostatic hyperplasia, unspecified whether lower urinary tract symptoms present  Will follow up with urology.    Discussed worsening signs/symptoms and when to return to clinic or go to ED.   Patient expresses understanding and agrees with treatment plan.         Creatinine clearance 43.07

## 2020-07-14 NOTE — TELEPHONE ENCOUNTER
----- Message from Tabatha Edwin sent at 7/14/2020 11:46 AM CDT -----  Regarding: bad UTI  Contact: pt  Type: Needs Medical Advice    Who Called:      Best Call Back Number:     Additional Information: Requesting a call back from Nurse, regarding access to an appt for today pt is in a lot of pain and maybe have a Bad UTI  with little blood and wants to be seen and meds,please call back with advice

## 2020-07-14 NOTE — TELEPHONE ENCOUNTER
----- Message from Suresh Tovar sent at 7/14/2020 10:08 AM CDT -----   Name of Who is Calling:     What is the request in detail:  patient request call back in reference to find out the name of doctor  that dr plaza referred him to on the [facility]  Please contact to further discuss and advise      Can the clinic reply by [facility]: no     What Number to Call Back if not in [facility]:  [phone]

## 2020-07-14 NOTE — TELEPHONE ENCOUNTER
Spoke to pt and booked him an apt with apolinar salter NP. Pt believies he has a uti and would like an antibiotic. Pt is taking AZO OTC. I also booked him an apt to f/u with urology, in a few weeks to get established with dr delarosa.

## 2020-07-14 NOTE — PATIENT INSTRUCTIONS
Bladder Infection (Cystitis), Male (Child)  A bladder infection is when bacteria cause the bladder to be inflamed. The bladder holds urine. A tube called the urethra takes urine from the bladder out of the body. Sometimes bacteria can travel up the urethra. This causes the infection.     The most common cause of bladder infections in children is bacteria from the bowels. The bacteria can get onto the skin around the urethra, and then into the urine. From there it can travel up to the bladder. This can happen because of:  · Poor cleaning after using the toilet or during a diaper change  · Poor cleaning of the foreskin  · Not completely emptying the bladder  · Constipation that prevents the bladder from emptying completely  · Not drinking enough fluids to urinate often  · Irritation of the urethra from soaps or tight clothes  Symptoms of a bladder infection include the need to urinate often and urgently. It may be painful. The urine may have a strong smell. It may be dark, tinted with blood, or cloudy. Your child may not be able to hold urine and may wet the bed or clothes. Your child may also have a fever and belly pain. Some children dont have symptoms. A baby may be fussy and not able to be soothed. He or she may cry when urinating. Your baby may also feed less or be less active.  A bladder infection is treated with antibiotics. Your child's healthcare provider may also prescribe a medicine to treat pain. Children get better from a bladder infection quickly.  In many cases a bladder infection will come back. Its important to take steps to prevent it (see below).  Home care  The healthcare provider may prescribe medicine to treat the infection. Follow all instructions for giving this medicine to your child. Use the medicine as instructed every day until it is gone. Dont stop giving it to your child if he feels better. Dont give your child aspirin unless you are told to by the healthcare provider.  For children  ages 2 and up: If your child's healthcare provider says it's OK, you can give acetaminophen or ibuprofen for pain, fever, fussiness, or discomfort. If your child has chronic liver or kidney disease, talk with the healthcare provider before giving these medicines. Also talk with your provider if your child has ever had a stomach ulcer or GI bleeding, or is taking blood thinners.  General care  · Keep track of how often your child urinates. Note the urine color and amount.  · Tell your child to urinate often. Tell him to completely empty the bladder each time. This will help flush out bacteria.  · Have your child wear loose clothes and cotton underwear.  · Make sure that your child drinks enough fluids. Give your child cranberry juice if advised by the healthcare provider.  Prevention  · Clean your childs penis every day. If he is uncircumcised, retract the foreskin when cleaning.  · Make sure diapers arent tight. If you use cloth diapers, use cotton or wool protectors rather than nylon or rubber pants.   · Change soiled diapers right away.  · Make sure your child drinks plenty of fluids. Or, make sure your baby feeds often. This is to prevent dehydration.  · Make sure your child urinates when needed, and does not hold it in.  · Dont give your child bubble baths. They can irritate the urethra.  Follow-up care  Follow up with your childs healthcare provider, or as advised. If a culture was done, you will be told of any new findings that may affect your child's care.  Call 911  Call 911 if any of these occur:  · Trouble breathing  · Difficulty arousing  · Fainting or loss of consciousness  · Rapid heart rate  · Seizure  When to seek medical advice  Call your child's healthcare provider right away if any of these occur:  · Fever of 100.4°F (38°C) or higher, or as advised  · Symptoms dont get better after 24 hours of treatment  · Vomiting or inability to keep down medicine  · Pain gets worse  · Pain in the low back,  belly, or side  · Foul-smelling urine  · Yellow tint to the skin or eyes (jaundice)  Date Last Reviewed: 10/1/2016  © 4404-4446 [employer]. [address] [place] [address], [place] [address]. All rights reserved. This information is not intended as a substitute for professional medical care. Always follow your healthcare professional's instructions.

## 2020-07-15 ENCOUNTER — TELEPHONE (OUTPATIENT)
Dept: FAMILY MEDICINE | Facility: CLINIC | Age: 80
End: 2020-07-15

## 2020-07-15 NOTE — TELEPHONE ENCOUNTER
----- Message from Muriel Chase sent at 7/15/2020  9:28 AM CDT -----  Regarding: Results/requesting call back  Contact: Patient  Type:  Test Results    Who Called:  Patient  Name of Test (Lab/Mammo/Etc):  UA  Date of Test:  7/14  Ordering Provider:  Ricki   Where the test was performed:  [facility]  Best Call Back Number:  [phone]  Additional Information:    Pt states he was supposed to be called yesterday with results but no one called him & is very upset.  Says he has a miserable night due to his UTI & requesting a call ASAP.

## 2020-07-16 LAB — BACTERIA UR CULT: NO GROWTH

## 2020-07-17 ENCOUNTER — TELEPHONE (OUTPATIENT)
Dept: FAMILY MEDICINE | Facility: CLINIC | Age: 80
End: 2020-07-17

## 2020-07-17 NOTE — TELEPHONE ENCOUNTER
----- Message from Debra Ricketts sent at 7/17/2020 11:35 AM CDT -----  Regarding: results  Contact: patient  Type:  Test Results  Who Called:  patient  Name of Test (Lab/Mammo/Etc):  urine  Date of Test:  7/14  Ordering Provider:  gaetano  Where the test was performed:  [facility]  Best Call Back Number:  [phone]  Additional Information:  Patient states he is on medication doing a lil better but NO ONE has called him back to go over his results. Please call to advise. Thanks!

## 2020-07-17 NOTE — TELEPHONE ENCOUNTER
----- Message from Debra Ricketts sent at 7/17/2020 11:35 AM CDT -----  Regarding: results  Contact: patient  Type:  Test Results  Who Called:  patient  Name of Test (Lab/Mammo/Etc):  urine  Date of Test:  7/14  Ordering Provider:  gaetano  Where the test was performed:  [facility]  Best Call Back Number:  [phone]  Additional Information:  Patient states he is on medication doing a lil better but NO ONE has called him back to go over his results. Please call to advise. Thanks!

## 2020-07-17 NOTE — TELEPHONE ENCOUNTER
Please check the urine culture results. The culture shows no growth. He needs to follow up with urology. Second it looks like no one called and let him know his blood work results. If you check I already have the note written. Thanks.

## 2021-07-26 ENCOUNTER — DOCUMENT SCAN (OUTPATIENT)
Dept: HOME HEALTH SERVICES | Facility: HOSPITAL | Age: 81
End: 2021-07-26

## 2022-06-01 PROBLEM — I61.9 HEMORRHAGIC STROKE: Status: ACTIVE | Noted: 2022-06-01

## 2022-06-01 PROBLEM — N17.9 AKI (ACUTE KIDNEY INJURY): Status: ACTIVE | Noted: 2022-06-01

## 2022-06-03 PROBLEM — N30.00 ACUTE CYSTITIS WITHOUT HEMATURIA: Status: ACTIVE | Noted: 2022-06-03

## 2022-06-04 PROBLEM — I10 INTRACRANIAL HEMORRHAGE, SPONTANEOUS INTRAPARENCHYMAL, ASSOCIATED WITH HYPERTENSION, ACUTE: Status: ACTIVE | Noted: 2022-06-01

## 2022-06-04 PROBLEM — M77.8 TENDONITIS OF ELBOW, LEFT: Status: ACTIVE | Noted: 2022-06-04

## 2022-06-04 PROBLEM — G81.94 ACUTE LEFT HEMIPARESIS: Status: ACTIVE | Noted: 2022-06-04

## 2022-06-21 ENCOUNTER — HOSPITAL ENCOUNTER (OUTPATIENT)
Dept: RADIOLOGY | Facility: HOSPITAL | Age: 82
Discharge: HOME | End: 2022-06-21
Attending: PHYSICAL MEDICINE & REHABILITATION
Payer: MEDICARE

## 2022-06-21 PROCEDURE — 70450 CT HEAD WITHOUT CONTRAST: ICD-10-PCS | Mod: 26,,, | Performed by: RADIOLOGY

## 2022-06-21 PROCEDURE — 70450 CT HEAD/BRAIN W/O DYE: CPT | Mod: 26,,, | Performed by: RADIOLOGY
